# Patient Record
Sex: FEMALE | URBAN - METROPOLITAN AREA
[De-identification: names, ages, dates, MRNs, and addresses within clinical notes are randomized per-mention and may not be internally consistent; named-entity substitution may affect disease eponyms.]

---

## 2017-03-30 ENCOUNTER — EMERGENCY (EMERGENCY)
Facility: HOSPITAL | Age: 57
LOS: 1 days | Discharge: HOME | End: 2017-03-30
Admitting: EMERGENCY MEDICINE
Payer: MEDICAID

## 2017-03-30 VITALS
SYSTOLIC BLOOD PRESSURE: 152 MMHG | TEMPERATURE: 98 F | RESPIRATION RATE: 20 BRPM | DIASTOLIC BLOOD PRESSURE: 100 MMHG | HEART RATE: 100 BPM | OXYGEN SATURATION: 100 %

## 2017-03-30 DIAGNOSIS — R69 ILLNESS, UNSPECIFIED: ICD-10-CM

## 2017-03-30 DIAGNOSIS — F25.9 SCHIZOAFFECTIVE DISORDER, UNSPECIFIED: ICD-10-CM

## 2017-03-30 PROCEDURE — 99284 EMERGENCY DEPT VISIT MOD MDM: CPT

## 2017-03-30 PROCEDURE — 90792 PSYCH DIAG EVAL W/MED SRVCS: CPT | Mod: GC
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
## 2017-03-30 NOTE — ED BEHAVIORAL HEALTH ASSESSMENT NOTE - CASE SUMMARY
This is a 55yo trans man who was brought to the ER with EMS after he called 911.  He had a verbal altercation with a peer and was concerned things who escalate.  He denies SI/HI/beulah/psychosis/depression/anxiety at this time that would require inpatient treatment but he is interested in restarting medication with his outpatient provider.  No indication for inpatient treatment at this time.

## 2017-03-30 NOTE — ED BEHAVIORAL HEALTH ASSESSMENT NOTE - REFERRAL / APPOINTMENT DETAILS
Pt to follow up with his psychiatrist as soon as possible. Pt to follow up with his psychiatrist as soon as possible through his residence
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
## 2017-03-30 NOTE — ED BEHAVIORAL HEALTH ASSESSMENT NOTE - RISK ASSESSMENT
Pt has risk factors of noncompliance with treatment and meds, hx of hospitalizations, diagnosis of schizoaffective disorder.   Pt has protective factors of future orientation, not suicidal or homicidal, no access to firearms, no current substance use.

## 2017-03-30 NOTE — ED ADULT NURSE NOTE - CHIEF COMPLAINT QUOTE
Pt from [facility] arrives with police escort for non compliant to medications x 3 months and erratic behavior.

## 2017-03-30 NOTE — ED ADULT NURSE NOTE - OBJECTIVE STATEMENT
Patient brought from [facility], accompany by  PO handcuffed due to eratical behavior. Patient states that somebody was bothering her for few months. Patient denies SI/HI. Calm and cooperative at the moment, handcuffed removed by PO.

## 2017-03-30 NOTE — ED BEHAVIORAL HEALTH ASSESSMENT NOTE - SUMMARY
Pt is a 56 year old male (transitioning from female gender but previously male gender), domiciled at [facility] at [place], hx of schizoaffective diagnosis, multiple prior hospitalizations most recently 5 years ago, denies suicide attempts, possible hx of aggression-unclear per pt interview, remote hx of substance use, denies legal problems, BIB EMS after pt called police himself.  Pt is calm and cooperative on interview, does not appear internally preoccupied.  He admits to stopping risperdal consta due to side effects.  Pt is not a risk to himself or others and is functioning well.  Residential staff do not have any safety concerns regarding pt.  Will recommend pt to follow up with his psychiatrist as soon as possible and will discharge pt to his residence.  Pt does not meet criteria for inpatient level of care.

## 2017-03-30 NOTE — ED ADULT TRIAGE NOTE - CHIEF COMPLAINT QUOTE
Pt from [facility] arrives with police escort for non compliant to medications x 3 months and erratic behavior.

## 2017-06-15 ENCOUNTER — EMERGENCY (EMERGENCY)
Facility: HOSPITAL | Age: 57
LOS: 1 days | Discharge: HOME | End: 2017-06-15
Attending: EMERGENCY MEDICINE | Admitting: EMERGENCY MEDICINE
Payer: MEDICAID

## 2017-06-15 VITALS
HEART RATE: 101 BPM | DIASTOLIC BLOOD PRESSURE: 84 MMHG | TEMPERATURE: 98 F | SYSTOLIC BLOOD PRESSURE: 134 MMHG | OXYGEN SATURATION: 97 % | RESPIRATION RATE: 18 BRPM

## 2017-06-15 VITALS — HEART RATE: 92 BPM

## 2017-06-15 LAB
ALBUMIN SERPL ELPH-MCNC: 4.4 G/DL — SIGNIFICANT CHANGE UP (ref 3.3–5)
ALP SERPL-CCNC: 82 U/L — SIGNIFICANT CHANGE UP (ref 40–120)
ALT FLD-CCNC: 15 U/L — SIGNIFICANT CHANGE UP (ref 4–41)
AMPHET UR-MCNC: NEGATIVE — SIGNIFICANT CHANGE UP
APAP SERPL-MCNC: < 15 UG/ML — LOW (ref 15–25)
APPEARANCE UR: CLEAR — SIGNIFICANT CHANGE UP
AST SERPL-CCNC: 15 U/L — SIGNIFICANT CHANGE UP (ref 4–40)
BACTERIA # UR AUTO: SIGNIFICANT CHANGE UP
BARBITURATES MEASUREMENT: NEGATIVE — SIGNIFICANT CHANGE UP
BARBITURATES UR SCN-MCNC: NEGATIVE — SIGNIFICANT CHANGE UP
BASOPHILS # BLD AUTO: 0.02 K/UL — SIGNIFICANT CHANGE UP (ref 0–0.2)
BASOPHILS NFR BLD AUTO: 0.2 % — SIGNIFICANT CHANGE UP (ref 0–2)
BENZODIAZ SERPL-MCNC: NEGATIVE — SIGNIFICANT CHANGE UP
BENZODIAZ UR-MCNC: NEGATIVE — SIGNIFICANT CHANGE UP
BILIRUB SERPL-MCNC: 0.5 MG/DL — SIGNIFICANT CHANGE UP (ref 0.2–1.2)
BILIRUB UR-MCNC: NEGATIVE — SIGNIFICANT CHANGE UP
BLOOD UR QL VISUAL: NEGATIVE — SIGNIFICANT CHANGE UP
BUN SERPL-MCNC: 15 MG/DL — SIGNIFICANT CHANGE UP (ref 7–23)
CALCIUM SERPL-MCNC: 9.5 MG/DL — SIGNIFICANT CHANGE UP (ref 8.4–10.5)
CANNABINOIDS UR-MCNC: NEGATIVE — SIGNIFICANT CHANGE UP
CHLORIDE SERPL-SCNC: 105 MMOL/L — SIGNIFICANT CHANGE UP (ref 98–107)
CO2 SERPL-SCNC: 23 MMOL/L — SIGNIFICANT CHANGE UP (ref 22–31)
COCAINE METAB.OTHER UR-MCNC: NEGATIVE — SIGNIFICANT CHANGE UP
COLOR SPEC: YELLOW — SIGNIFICANT CHANGE UP
CREAT SERPL-MCNC: 1.03 MG/DL — SIGNIFICANT CHANGE UP (ref 0.5–1.3)
EOSINOPHIL # BLD AUTO: 0.21 K/UL — SIGNIFICANT CHANGE UP (ref 0–0.5)
EOSINOPHIL NFR BLD AUTO: 2.1 % — SIGNIFICANT CHANGE UP (ref 0–6)
ETHANOL BLD-MCNC: < 10 MG/DL — SIGNIFICANT CHANGE UP
GLUCOSE SERPL-MCNC: 98 MG/DL — SIGNIFICANT CHANGE UP (ref 70–99)
GLUCOSE UR-MCNC: NEGATIVE — SIGNIFICANT CHANGE UP
HCT VFR BLD CALC: 39 % — SIGNIFICANT CHANGE UP (ref 39–50)
HGB BLD-MCNC: 13 G/DL — SIGNIFICANT CHANGE UP (ref 13–17)
IMM GRANULOCYTES NFR BLD AUTO: 0.1 % — SIGNIFICANT CHANGE UP (ref 0–1.5)
KETONES UR-MCNC: NEGATIVE — SIGNIFICANT CHANGE UP
LEUKOCYTE ESTERASE UR-ACNC: NEGATIVE — SIGNIFICANT CHANGE UP
LYMPHOCYTES # BLD AUTO: 2.77 K/UL — SIGNIFICANT CHANGE UP (ref 1–3.3)
LYMPHOCYTES # BLD AUTO: 27.6 % — SIGNIFICANT CHANGE UP (ref 13–44)
MCHC RBC-ENTMCNC: 27.4 PG — SIGNIFICANT CHANGE UP (ref 27–34)
MCHC RBC-ENTMCNC: 33.3 % — SIGNIFICANT CHANGE UP (ref 32–36)
MCV RBC AUTO: 82.3 FL — SIGNIFICANT CHANGE UP (ref 80–100)
METHADONE UR-MCNC: NEGATIVE — SIGNIFICANT CHANGE UP
MONOCYTES # BLD AUTO: 0.46 K/UL — SIGNIFICANT CHANGE UP (ref 0–0.9)
MONOCYTES NFR BLD AUTO: 4.6 % — SIGNIFICANT CHANGE UP (ref 2–14)
MUCOUS THREADS # UR AUTO: SIGNIFICANT CHANGE UP
NEUTROPHILS # BLD AUTO: 6.57 K/UL — SIGNIFICANT CHANGE UP (ref 1.8–7.4)
NEUTROPHILS NFR BLD AUTO: 65.4 % — SIGNIFICANT CHANGE UP (ref 43–77)
NITRITE UR-MCNC: NEGATIVE — SIGNIFICANT CHANGE UP
NON-SQ EPI CELLS # UR AUTO: <1 — SIGNIFICANT CHANGE UP
OPIATES UR-MCNC: NEGATIVE — SIGNIFICANT CHANGE UP
OXYCODONE UR-MCNC: NEGATIVE — SIGNIFICANT CHANGE UP
PCP UR-MCNC: NEGATIVE — SIGNIFICANT CHANGE UP
PH UR: 5.5 — SIGNIFICANT CHANGE UP (ref 4.6–8)
PLATELET # BLD AUTO: 218 K/UL — SIGNIFICANT CHANGE UP (ref 150–400)
PMV BLD: 11.3 FL — SIGNIFICANT CHANGE UP (ref 7–13)
POTASSIUM SERPL-MCNC: 4.2 MMOL/L — SIGNIFICANT CHANGE UP (ref 3.5–5.3)
POTASSIUM SERPL-SCNC: 4.2 MMOL/L — SIGNIFICANT CHANGE UP (ref 3.5–5.3)
PROT SERPL-MCNC: 7.3 G/DL — SIGNIFICANT CHANGE UP (ref 6–8.3)
PROT UR-MCNC: 10 — SIGNIFICANT CHANGE UP
RBC # BLD: 4.74 M/UL — SIGNIFICANT CHANGE UP (ref 4.2–5.8)
RBC # FLD: 14 % — SIGNIFICANT CHANGE UP (ref 10.3–14.5)
RBC CASTS # UR COMP ASSIST: SIGNIFICANT CHANGE UP (ref 0–?)
SALICYLATES SERPL-MCNC: < 5 MG/DL — LOW (ref 15–30)
SODIUM SERPL-SCNC: 143 MMOL/L — SIGNIFICANT CHANGE UP (ref 135–145)
SP GR SPEC: 1.02 — SIGNIFICANT CHANGE UP (ref 1–1.03)
TSH SERPL-MCNC: 0.72 UIU/ML — SIGNIFICANT CHANGE UP (ref 0.27–4.2)
UROBILINOGEN FLD QL: NORMAL E.U. — SIGNIFICANT CHANGE UP (ref 0.1–0.2)
WBC # BLD: 10.04 K/UL — SIGNIFICANT CHANGE UP (ref 3.8–10.5)
WBC # FLD AUTO: 10.04 K/UL — SIGNIFICANT CHANGE UP (ref 3.8–10.5)
WBC UR QL: SIGNIFICANT CHANGE UP (ref 0–?)

## 2017-06-15 PROCEDURE — 90792 PSYCH DIAG EVAL W/MED SRVCS: CPT

## 2017-06-15 PROCEDURE — 99284 EMERGENCY DEPT VISIT MOD MDM: CPT
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
## 2017-06-15 NOTE — ED BEHAVIORAL HEALTH ASSESSMENT NOTE - SUMMARY
Pt is a 56 year old trans man, domiciled at [facility] at [place], hx of schizoaffective diagnosis, multiple prior hospitalizations most recently 5 years ago, denies suicide attempts, possible hx of aggression-unclear per pt interview, remote hx of substance use, denies legal problems, BIB EMS after returning to residence after being away for 6 weeks for "psychiatric clearance."  Slightly disorganized on exam but otherwise without acute psychiatric symptoms.  No safety conerns that would warrant inpatient hospitalization and willing to return to residence and see his outpt psychiatrist.
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
## 2017-06-15 NOTE — ED ADULT NURSE REASSESSMENT NOTE - NS ED NURSE REASSESS COMMENT FT1
16:55-Patient in improved and stable condition, discharged as per MD Garcia order, discharge instructions given, pt verbalized understanding and left ER a&ox3 with metrocard.

## 2017-06-15 NOTE — ED PROVIDER NOTE - ATTENDING CONTRIBUTION TO CARE
I performed a face to face evaluation of this patient and obtained a history and performed a full exam.  I agree with the history, physical exam and plan of the PA.  Pt sent from [facility] for not taking meds, agitation, currently calm without c/o head wnl, lungs and heart wnl, abd soft, psych, no si, hi, ah, vh, calm.  For psych eval

## 2017-06-26 ENCOUNTER — EMERGENCY (EMERGENCY)
Facility: HOSPITAL | Age: 57
LOS: 1 days | Discharge: HOME | End: 2017-06-26
Admitting: EMERGENCY MEDICINE
Payer: MEDICAID

## 2017-06-26 VITALS
DIASTOLIC BLOOD PRESSURE: 89 MMHG | SYSTOLIC BLOOD PRESSURE: 143 MMHG | HEART RATE: 111 BPM | RESPIRATION RATE: 16 BRPM | TEMPERATURE: 98 F | OXYGEN SATURATION: 99 %

## 2017-06-26 PROCEDURE — 90792 PSYCH DIAG EVAL W/MED SRVCS: CPT

## 2017-06-26 PROCEDURE — 99284 EMERGENCY DEPT VISIT MOD MDM: CPT
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
## 2017-06-26 NOTE — ED BEHAVIORAL HEALTH NOTE - BEHAVIORAL HEALTH NOTE
SW informed that pt is stable for d/c and safe to travel via public transportation. SW provided metrocard serial # [id number].

## 2017-06-26 NOTE — ED ADULT TRIAGE NOTE - CHIEF COMPLAINT QUOTE
Pt arrives from [facility] after 911 was initiated by staff for "pt pacing around and carrying a chair leg". A verbal argument ensued after. However, per EMS upon their arrival the pt was calm and cooperative. Denies any si/hi, hallucinations, etoh/substance use.

## 2017-06-26 NOTE — ED PROVIDER NOTE - MEDICAL DECISION MAKING DETAILS
57y/o Male hx of schizophrenia from [place] sent to ed for eval 2/2 agitation. Pt is well appearing, calm and cooperative, w/o visible signs of physical injuries on exam, head NCAT, no C-spine tend, CN II- XII intact, ambulating w/ steady gait, pulm- BL- CTA, CV- S1S2-  Will clear pt medically and dispo as per psych- 57y/o Male hx of schizophrenia from [place] sent to ed for eval 2/2 agitation. Pt is well appearing, calm and cooperative, w/o visible signs of physical injuries on exam, head NCAT, no C-spine tend, CN II- XII intact, ambulating w/ steady gait, pulm- BL- CTA, CV- S1S2, apical pulse 90-  Will clear pt medically and dispo as per psych-

## 2017-06-26 NOTE — ED ADULT NURSE NOTE - CHIEF COMPLAINT QUOTE
Pt arrives from [facility] after 911 was initiated by staff for "pt pacing around and carrying a chair leg". A verbal argument ensued after. However, per EMS upon their arrival the pt was calm and cooperative. Denies any si/hi, hallucinations, etoh/substance use.

## 2017-06-26 NOTE — ED BEHAVIORAL HEALTH ASSESSMENT NOTE - SUMMARY
Pt is a 56 year old trans man, domiciled at [facility] at [place], hx of schizoaffective diagnosis, multiple prior hospitalizations most recently 5 years ago, denies suicide attempts, possible hx of aggression-unclear per pt interview, remote hx of substance use, denies legal problems, BIB EMS after returning to residence after being away for 6 weeks for "psychiatric clearance."  Slightly disorganized on exam but otherwise without acute psychiatric symptoms.  No safety conerns that would warrant inpatient hospitalization and willing to return to residence and see his outpt psychiatrist. Pt is a 56 year old trans man, domiciled at [facility] at [place], hx of schizoaffective diagnosis, multiple prior hospitalizations most recently 5 years ago, denies suicide attempts, possible hx of aggression-unclear per pt interview, remote hx of substance use, denies legal problems, BIB EMS after returning to residence after being away for 6 weeks for "psychiatric clearance."  Slightly disorganized on exam but otherwise without acute psychiatric symptoms.  No safety concerns that would warrant inpatient hospitalization and willing to return to residence and see his outpt psychiatrist.

## 2017-06-26 NOTE — ED PROVIDER NOTE - CHPI ED SYMPTOMS NEG
no homicidal/no weight loss/no disorientation/no suicidal/no agitation/no confusion/no weakness/no hallucinations/no paranoia/no change in level of consciousness

## 2017-06-30 ENCOUNTER — INPATIENT (INPATIENT)
Facility: HOSPITAL | Age: 57
LOS: 10 days | Discharge: HOME | End: 2017-07-11
Attending: PSYCHIATRY & NEUROLOGY | Admitting: PSYCHIATRY & NEUROLOGY
Payer: MEDICAID

## 2017-06-30 VITALS
HEART RATE: 93 BPM | OXYGEN SATURATION: 99 % | DIASTOLIC BLOOD PRESSURE: 89 MMHG | TEMPERATURE: 98 F | RESPIRATION RATE: 18 BRPM | SYSTOLIC BLOOD PRESSURE: 134 MMHG

## 2017-06-30 DIAGNOSIS — F20.0 PARANOID SCHIZOPHRENIA: ICD-10-CM

## 2017-06-30 DIAGNOSIS — F20.9 SCHIZOPHRENIA, UNSPECIFIED: ICD-10-CM

## 2017-06-30 DIAGNOSIS — F10.10 ALCOHOL ABUSE, UNCOMPLICATED: ICD-10-CM

## 2017-06-30 LAB
ALBUMIN SERPL ELPH-MCNC: 4.3 G/DL — SIGNIFICANT CHANGE UP (ref 3.3–5)
ALP SERPL-CCNC: 81 U/L — SIGNIFICANT CHANGE UP (ref 40–120)
ALT FLD-CCNC: 16 U/L — SIGNIFICANT CHANGE UP (ref 4–41)
AMPHET UR-MCNC: NEGATIVE — SIGNIFICANT CHANGE UP
APAP SERPL-MCNC: < 15 UG/ML — LOW (ref 15–25)
APPEARANCE UR: CLEAR — SIGNIFICANT CHANGE UP
AST SERPL-CCNC: 16 U/L — SIGNIFICANT CHANGE UP (ref 4–40)
BACTERIA # UR AUTO: SIGNIFICANT CHANGE UP
BARBITURATES MEASUREMENT: NEGATIVE — SIGNIFICANT CHANGE UP
BARBITURATES UR SCN-MCNC: NEGATIVE — SIGNIFICANT CHANGE UP
BASOPHILS # BLD AUTO: 0.05 K/UL — SIGNIFICANT CHANGE UP (ref 0–0.2)
BASOPHILS NFR BLD AUTO: 0.6 % — SIGNIFICANT CHANGE UP (ref 0–2)
BENZODIAZ SERPL-MCNC: NEGATIVE — SIGNIFICANT CHANGE UP
BENZODIAZ UR-MCNC: NEGATIVE — SIGNIFICANT CHANGE UP
BILIRUB SERPL-MCNC: 0.2 MG/DL — SIGNIFICANT CHANGE UP (ref 0.2–1.2)
BILIRUB UR-MCNC: NEGATIVE — SIGNIFICANT CHANGE UP
BLOOD UR QL VISUAL: NEGATIVE — SIGNIFICANT CHANGE UP
BUN SERPL-MCNC: 11 MG/DL — SIGNIFICANT CHANGE UP (ref 7–23)
CALCIUM SERPL-MCNC: 9.5 MG/DL — SIGNIFICANT CHANGE UP (ref 8.4–10.5)
CANNABINOIDS UR-MCNC: NEGATIVE — SIGNIFICANT CHANGE UP
CHLORIDE SERPL-SCNC: 102 MMOL/L — SIGNIFICANT CHANGE UP (ref 98–107)
CO2 SERPL-SCNC: 24 MMOL/L — SIGNIFICANT CHANGE UP (ref 22–31)
COCAINE METAB.OTHER UR-MCNC: NEGATIVE — SIGNIFICANT CHANGE UP
COLOR SPEC: YELLOW — SIGNIFICANT CHANGE UP
CREAT SERPL-MCNC: 1.01 MG/DL — SIGNIFICANT CHANGE UP (ref 0.5–1.3)
EOSINOPHIL # BLD AUTO: 0.17 K/UL — SIGNIFICANT CHANGE UP (ref 0–0.5)
EOSINOPHIL NFR BLD AUTO: 2.2 % — SIGNIFICANT CHANGE UP (ref 0–6)
ETHANOL BLD-MCNC: < 10 MG/DL — SIGNIFICANT CHANGE UP
GLUCOSE SERPL-MCNC: 100 MG/DL — HIGH (ref 70–99)
GLUCOSE UR-MCNC: NEGATIVE — SIGNIFICANT CHANGE UP
HCT VFR BLD CALC: 39.1 % — SIGNIFICANT CHANGE UP (ref 39–50)
HGB BLD-MCNC: 13.1 G/DL — SIGNIFICANT CHANGE UP (ref 13–17)
IMM GRANULOCYTES # BLD AUTO: 0.03 # — SIGNIFICANT CHANGE UP
IMM GRANULOCYTES NFR BLD AUTO: 0.4 % — SIGNIFICANT CHANGE UP (ref 0–1.5)
KETONES UR-MCNC: NEGATIVE — SIGNIFICANT CHANGE UP
LEUKOCYTE ESTERASE UR-ACNC: NEGATIVE — SIGNIFICANT CHANGE UP
LYMPHOCYTES # BLD AUTO: 2.19 K/UL — SIGNIFICANT CHANGE UP (ref 1–3.3)
LYMPHOCYTES # BLD AUTO: 28.4 % — SIGNIFICANT CHANGE UP (ref 13–44)
MCHC RBC-ENTMCNC: 27.3 PG — SIGNIFICANT CHANGE UP (ref 27–34)
MCHC RBC-ENTMCNC: 33.5 % — SIGNIFICANT CHANGE UP (ref 32–36)
MCV RBC AUTO: 81.6 FL — SIGNIFICANT CHANGE UP (ref 80–100)
METHADONE UR-MCNC: NEGATIVE — SIGNIFICANT CHANGE UP
MONOCYTES # BLD AUTO: 0.36 K/UL — SIGNIFICANT CHANGE UP (ref 0–0.9)
MONOCYTES NFR BLD AUTO: 4.7 % — SIGNIFICANT CHANGE UP (ref 2–14)
MUCOUS THREADS # UR AUTO: SIGNIFICANT CHANGE UP
NEUTROPHILS # BLD AUTO: 4.92 K/UL — SIGNIFICANT CHANGE UP (ref 1.8–7.4)
NEUTROPHILS NFR BLD AUTO: 63.7 % — SIGNIFICANT CHANGE UP (ref 43–77)
NITRITE UR-MCNC: NEGATIVE — SIGNIFICANT CHANGE UP
NRBC # FLD: 0 — SIGNIFICANT CHANGE UP
OPIATES UR-MCNC: NEGATIVE — SIGNIFICANT CHANGE UP
OXYCODONE UR-MCNC: NEGATIVE — SIGNIFICANT CHANGE UP
PCP UR-MCNC: NEGATIVE — SIGNIFICANT CHANGE UP
PH UR: 6 — SIGNIFICANT CHANGE UP (ref 4.6–8)
PLATELET # BLD AUTO: 242 K/UL — SIGNIFICANT CHANGE UP (ref 150–400)
PMV BLD: 11 FL — SIGNIFICANT CHANGE UP (ref 7–13)
POTASSIUM SERPL-MCNC: 4.2 MMOL/L — SIGNIFICANT CHANGE UP (ref 3.5–5.3)
POTASSIUM SERPL-SCNC: 4.2 MMOL/L — SIGNIFICANT CHANGE UP (ref 3.5–5.3)
PROT SERPL-MCNC: 7.2 G/DL — SIGNIFICANT CHANGE UP (ref 6–8.3)
PROT UR-MCNC: 30 — SIGNIFICANT CHANGE UP
RBC # BLD: 4.79 M/UL — SIGNIFICANT CHANGE UP (ref 4.2–5.8)
RBC # FLD: 14 % — SIGNIFICANT CHANGE UP (ref 10.3–14.5)
RBC CASTS # UR COMP ASSIST: SIGNIFICANT CHANGE UP (ref 0–?)
SALICYLATES SERPL-MCNC: < 5 MG/DL — LOW (ref 15–30)
SODIUM SERPL-SCNC: 141 MMOL/L — SIGNIFICANT CHANGE UP (ref 135–145)
SP GR SPEC: 1.02 — SIGNIFICANT CHANGE UP (ref 1–1.03)
SQUAMOUS # UR AUTO: SIGNIFICANT CHANGE UP
TSH SERPL-MCNC: 0.54 UIU/ML — SIGNIFICANT CHANGE UP (ref 0.27–4.2)
UROBILINOGEN FLD QL: 1 E.U. — SIGNIFICANT CHANGE UP (ref 0.1–0.2)
WBC # BLD: 7.72 K/UL — SIGNIFICANT CHANGE UP (ref 3.8–10.5)
WBC # FLD AUTO: 7.72 K/UL — SIGNIFICANT CHANGE UP (ref 3.8–10.5)
WBC UR QL: SIGNIFICANT CHANGE UP (ref 0–?)

## 2017-06-30 PROCEDURE — 99285 EMERGENCY DEPT VISIT HI MDM: CPT | Mod: GC

## 2017-06-30 RX ORDER — ACETAMINOPHEN 500 MG
650 TABLET ORAL EVERY 6 HOURS
Qty: 0 | Refills: 0 | Status: DISCONTINUED | OUTPATIENT
Start: 2017-06-30 | End: 2017-07-11

## 2017-06-30 RX ORDER — ARIPIPRAZOLE 15 MG/1
5 TABLET ORAL DAILY
Qty: 0 | Refills: 0 | Status: DISCONTINUED | OUTPATIENT
Start: 2017-06-30 | End: 2017-06-30

## 2017-06-30 RX ORDER — HALOPERIDOL DECANOATE 100 MG/ML
5 INJECTION INTRAMUSCULAR EVERY 6 HOURS
Qty: 0 | Refills: 0 | Status: DISCONTINUED | OUTPATIENT
Start: 2017-06-30 | End: 2017-07-11

## 2017-06-30 RX ORDER — HALOPERIDOL DECANOATE 100 MG/ML
5 INJECTION INTRAMUSCULAR ONCE
Qty: 0 | Refills: 0 | Status: DISCONTINUED | OUTPATIENT
Start: 2017-06-30 | End: 2017-07-11

## 2017-06-30 RX ORDER — ARIPIPRAZOLE 15 MG/1
10 TABLET ORAL DAILY
Qty: 0 | Refills: 0 | Status: DISCONTINUED | OUTPATIENT
Start: 2017-06-30 | End: 2017-07-03

## 2017-06-30 RX ADMIN — Medication 650 MILLIGRAM(S): at 16:33

## 2017-06-30 RX ADMIN — Medication 2 MILLIGRAM(S): at 20:30

## 2017-06-30 RX ADMIN — Medication 650 MILLIGRAM(S): at 15:33
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
## 2017-06-30 NOTE — ED ADULT NURSE REASSESSMENT NOTE - NS ED NURSE REASSESS COMMENT FT1
14:20-Patient transported to [facility] via security, safety maintained.

## 2017-06-30 NOTE — ED PROVIDER NOTE - OBJECTIVE STATEMENT
Denies pain, SOB, fever, chills, chest/abdominal discomfort. Denies HI/AH/VH. Denies recent use of alcohol or illicit drugs. 55 y/o Schizoaffective Disorder BIBA  w c/o acting out behaviour ,yelling and breaking things in his apartment secondary to medication non compliance. Denies punching or kicking any objects.  Denies pain, SOB, fever, chills, chest/abdominal discomfort. Denies SI/HI/AH/VH. Denies recent use of alcohol or illicit drugs.

## 2017-06-30 NOTE — ED BEHAVIORAL HEALTH ASSESSMENT NOTE - DESCRIPTION
Patient calm and cooperative, but paranoid. None. Patient on disability. Lives at [facility].

## 2017-06-30 NOTE — ED BEHAVIORAL HEALTH NOTE - BEHAVIORAL HEALTH NOTE
SW contacted [employer] [phone] to obtain pre-cert for pt's recent psychiatric admission to [facility]. As per Sam at [employer], pt is receiving SSI and his benefits are carved out of [facility] Medicaid. Pt does not require pre-cert for psych inpatient admission. No further SW intervention required at this time.

## 2017-06-30 NOTE — ED BEHAVIORAL HEALTH ASSESSMENT NOTE - AXIS IV
Problems with interaction with legal system/Economic problems/Occupational problems/Problems with primary support

## 2017-06-30 NOTE — ED BEHAVIORAL HEALTH ASSESSMENT NOTE - OTHER PAST PSYCHIATRIC HISTORY (INCLUDE DETAILS REGARDING ONSET, COURSE OF ILLNESS, INPATIENT/OUTPATIENT TREATMENT)
Patient has at least 4 prior inpatient psychiatric hospitalizations, including a hospitalization at [facility]. He has been on Risperdal, Haldol, Cogentin, Zyprexa, Clozapine (per him). According to his outpatient clinic, he has been on Risperdal Consta 25 mg IM, however he had gynecomastia. Was on Abilify. Was recently restarted on Abilify, however patient did not take it. He has not taken meds since 12/16.

## 2017-06-30 NOTE — ED BEHAVIORAL HEALTH ASSESSMENT NOTE - DESCRIPTION (FIRST USE, LAST USE, QUANTITY, FREQUENCY, DURATION)
Patient smokes 1-2 cigarettes daily. Patient drinks 1-2 drinks daily. Patient reports remote abuse of cannabis.
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
## 2017-06-30 NOTE — ED BEHAVIORAL HEALTH ASSESSMENT NOTE - DEPENDENTS
Gregoria Lemos is a 47 year old female presenting with a cold and started Saturday with a ST.  Then starting Sunday fevers and chills nasal and chest congestion.  Ears fells plugged too.      Denies known Latex allergy or symptoms of Latex sensitivity.  Currently is not taking any medications.  History   Smoking Status   • Never Smoker   Smokeless Tobacco   • Never Used       PCP verified  Pharmacy verified         None known

## 2017-06-30 NOTE — ED BEHAVIORAL HEALTH ASSESSMENT NOTE - SUMMARY
56 year old male with a history of schizoaffective disorder, presenting with agitation in the context of medication non-compliance. The patient is paranoid with thought disorder on interview. The patient has been agitated and violent at his residence and unhygenic (had fecal matter on the floor of the bathroom). Patient an acute risk to himself and others at this point.

## 2017-06-30 NOTE — ED PROVIDER NOTE - CARE PLAN
Principal Discharge DX:	Paranoid schizophrenia  Secondary Diagnosis:	Alcohol use disorder, mild, abuse

## 2017-06-30 NOTE — ED PROVIDER NOTE - MEDICAL DECISION MAKING DETAILS
55 y/o Schizoaffective Disorder  Labs, Urine Tox/UA, EKG .   Medical evaluation performed. There is no clinical evidence of intoxication or any acute medical problem requiring immediate intervention. Patient is awaiting psychiatric consultation. Final disposition will be determined by psychiatrist.

## 2017-06-30 NOTE — ED BEHAVIORAL HEALTH ASSESSMENT NOTE - SUICIDE RISK FACTORS
Unable to engage in safety planning/Access to means (pills, firearms, etc.)/Highly impulsive behavior/Substance abuse/dependence/Agitation/severe anxiety/History of abuse/trauma
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
## 2017-06-30 NOTE — ED BEHAVIORAL HEALTH ASSESSMENT NOTE - SUBSTANCE ISSUES AND PLAN (INCLUDE STANDING AND PRN MEDICATION)
Patient drinks 1-2 drinks daily and has never experienced alcohol withdrawal. Patient does not require CIWA or CINA.
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
## 2017-06-30 NOTE — ED ADULT TRIAGE NOTE - CHIEF COMPLAINT QUOTE
Patient brought to [place] by EMS for psych evaluation. Pt was throwing objects in facility. Pt non compliant with medication,
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
## 2017-07-01 PROCEDURE — 99223 1ST HOSP IP/OBS HIGH 75: CPT

## 2017-07-01 RX ADMIN — ARIPIPRAZOLE 10 MILLIGRAM(S): 15 TABLET ORAL at 08:42

## 2017-07-02 PROCEDURE — 99232 SBSQ HOSP IP/OBS MODERATE 35: CPT

## 2017-07-02 RX ADMIN — ARIPIPRAZOLE 10 MILLIGRAM(S): 15 TABLET ORAL at 09:15

## 2017-07-03 PROCEDURE — 99232 SBSQ HOSP IP/OBS MODERATE 35: CPT

## 2017-07-03 RX ORDER — ARIPIPRAZOLE 15 MG/1
20 TABLET ORAL DAILY
Qty: 0 | Refills: 0 | Status: DISCONTINUED | OUTPATIENT
Start: 2017-07-03 | End: 2017-07-05

## 2017-07-03 RX ADMIN — Medication 650 MILLIGRAM(S): at 18:44

## 2017-07-03 RX ADMIN — ARIPIPRAZOLE 10 MILLIGRAM(S): 15 TABLET ORAL at 08:09

## 2017-07-03 RX ADMIN — Medication 650 MILLIGRAM(S): at 17:44

## 2017-07-03 RX ADMIN — Medication 650 MILLIGRAM(S): at 11:37

## 2017-07-03 RX ADMIN — Medication 650 MILLIGRAM(S): at 12:37

## 2017-07-04 PROCEDURE — 99232 SBSQ HOSP IP/OBS MODERATE 35: CPT

## 2017-07-04 RX ADMIN — ARIPIPRAZOLE 20 MILLIGRAM(S): 15 TABLET ORAL at 08:02

## 2017-07-04 RX ADMIN — Medication 650 MILLIGRAM(S): at 08:15

## 2017-07-04 RX ADMIN — Medication 650 MILLIGRAM(S): at 07:15

## 2017-07-05 PROCEDURE — 99232 SBSQ HOSP IP/OBS MODERATE 35: CPT

## 2017-07-05 RX ORDER — ARIPIPRAZOLE 15 MG/1
30 TABLET ORAL DAILY
Qty: 0 | Refills: 0 | Status: DISCONTINUED | OUTPATIENT
Start: 2017-07-05 | End: 2017-07-11

## 2017-07-05 RX ADMIN — ARIPIPRAZOLE 20 MILLIGRAM(S): 15 TABLET ORAL at 08:09

## 2017-07-05 RX ADMIN — Medication 650 MILLIGRAM(S): at 17:20

## 2017-07-05 RX ADMIN — Medication 2 MILLIGRAM(S): at 20:38

## 2017-07-05 RX ADMIN — Medication 650 MILLIGRAM(S): at 08:59

## 2017-07-05 RX ADMIN — Medication 650 MILLIGRAM(S): at 16:29

## 2017-07-05 RX ADMIN — Medication 650 MILLIGRAM(S): at 07:50

## 2017-07-06 PROCEDURE — 99232 SBSQ HOSP IP/OBS MODERATE 35: CPT

## 2017-07-06 RX ADMIN — ARIPIPRAZOLE 30 MILLIGRAM(S): 15 TABLET ORAL at 08:48

## 2017-07-06 RX ADMIN — Medication 650 MILLIGRAM(S): at 14:59

## 2017-07-06 RX ADMIN — Medication 650 MILLIGRAM(S): at 15:01

## 2017-07-07 PROCEDURE — 99232 SBSQ HOSP IP/OBS MODERATE 35: CPT

## 2017-07-07 RX ORDER — NICOTINE POLACRILEX 2 MG
4 GUM BUCCAL
Qty: 0 | Refills: 0 | Status: DISCONTINUED | OUTPATIENT
Start: 2017-07-07 | End: 2017-07-11

## 2017-07-07 RX ADMIN — Medication 650 MILLIGRAM(S): at 09:42

## 2017-07-07 RX ADMIN — Medication 4 MILLIGRAM(S): at 14:48

## 2017-07-07 RX ADMIN — ARIPIPRAZOLE 30 MILLIGRAM(S): 15 TABLET ORAL at 08:05

## 2017-07-07 RX ADMIN — Medication 650 MILLIGRAM(S): at 09:44

## 2017-07-08 RX ADMIN — ARIPIPRAZOLE 30 MILLIGRAM(S): 15 TABLET ORAL at 08:11

## 2017-07-08 RX ADMIN — Medication 4 MILLIGRAM(S): at 11:20

## 2017-07-08 RX ADMIN — Medication 650 MILLIGRAM(S): at 06:44

## 2017-07-08 RX ADMIN — Medication 2 MILLIGRAM(S): at 21:28

## 2017-07-09 RX ORDER — SIMETHICONE 80 MG/1
80 TABLET, CHEWABLE ORAL ONCE
Qty: 0 | Refills: 0 | Status: COMPLETED | OUTPATIENT
Start: 2017-07-09 | End: 2017-07-09

## 2017-07-09 RX ADMIN — SIMETHICONE 80 MILLIGRAM(S): 80 TABLET, CHEWABLE ORAL at 16:41

## 2017-07-09 RX ADMIN — Medication 4 MILLIGRAM(S): at 13:20

## 2017-07-09 RX ADMIN — Medication 650 MILLIGRAM(S): at 16:29

## 2017-07-09 RX ADMIN — Medication 650 MILLIGRAM(S): at 17:00

## 2017-07-09 RX ADMIN — ARIPIPRAZOLE 30 MILLIGRAM(S): 15 TABLET ORAL at 08:18

## 2017-07-10 PROCEDURE — 99232 SBSQ HOSP IP/OBS MODERATE 35: CPT

## 2017-07-10 PROCEDURE — 73030 X-RAY EXAM OF SHOULDER: CPT | Mod: 26,RT

## 2017-07-10 RX ADMIN — ARIPIPRAZOLE 30 MILLIGRAM(S): 15 TABLET ORAL at 08:05

## 2017-07-11 VITALS — TEMPERATURE: 98 F

## 2017-07-11 PROCEDURE — 99238 HOSP IP/OBS DSCHRG MGMT 30/<: CPT

## 2017-07-11 RX ORDER — ARIPIPRAZOLE 15 MG/1
400 TABLET ORAL
Qty: 1 | Refills: 0 | OUTPATIENT
Start: 2017-07-11 | End: 2017-08-08

## 2017-07-11 RX ORDER — ARIPIPRAZOLE 15 MG/1
1 TABLET ORAL
Qty: 0 | Refills: 0 | COMMUNITY
Start: 2017-07-11

## 2017-07-11 RX ORDER — NICOTINE POLACRILEX 2 MG
0 GUM BUCCAL
Qty: 0 | Refills: 0 | COMMUNITY
Start: 2017-07-11

## 2017-07-11 RX ORDER — ARIPIPRAZOLE 15 MG/1
400 TABLET ORAL ONCE
Qty: 0 | Refills: 0 | Status: COMPLETED | OUTPATIENT
Start: 2017-07-11 | End: 2017-07-11

## 2017-07-11 RX ADMIN — ARIPIPRAZOLE 400 MILLIGRAM(S): 15 TABLET ORAL at 12:52

## 2017-07-11 RX ADMIN — ARIPIPRAZOLE 30 MILLIGRAM(S): 15 TABLET ORAL at 08:05

## 2017-07-11 RX ADMIN — Medication 650 MILLIGRAM(S): at 05:08

## 2017-07-11 RX ADMIN — Medication 4 MILLIGRAM(S): at 06:43

## 2017-09-23 ENCOUNTER — EMERGENCY (EMERGENCY)
Facility: HOSPITAL | Age: 57
LOS: 1 days | Discharge: LEFT BEFORE DISCHARGE | End: 2017-09-23
Attending: EMERGENCY MEDICINE | Admitting: EMERGENCY MEDICINE
Payer: MEDICAID

## 2017-09-23 VITALS
RESPIRATION RATE: 16 BRPM | HEART RATE: 86 BPM | TEMPERATURE: 98 F | DIASTOLIC BLOOD PRESSURE: 80 MMHG | SYSTOLIC BLOOD PRESSURE: 126 MMHG | OXYGEN SATURATION: 100 %

## 2017-09-23 VITALS — TEMPERATURE: 98 F | RESPIRATION RATE: 16 BRPM

## 2017-09-23 PROCEDURE — 90792 PSYCH DIAG EVAL W/MED SRVCS: CPT
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
## 2017-09-23 NOTE — ED ADULT NURSE NOTE - CHIEF COMPLAINT QUOTE
pt reports to triage. not fully oriented. stating chest pain,diff breathing. rambling on incoherently. stating" I don't want to discuss my pain" pt appears non labored respirations fit/behavioral health called and responded/

## 2017-09-23 NOTE — ED BEHAVIORAL HEALTH ASSESSMENT NOTE - SUMMARY
56 year old male with a history of schizoaffective disorder, presenting wanting a physical and then deciding that she does not want one. No acute safety risk.  No clear reason for evaluation.  Has capacity and states that she wants to return to [facility]. There is no indication for an involuntary admission.  To discharge home.

## 2017-09-23 NOTE — ED BEHAVIORAL HEALTH ASSESSMENT NOTE - DESCRIPTION
Patient calm and cooperative, slightly paranoid.  ICU Vital Signs Last 24 Hrs  T(C): 36.8 (23 Sep 2017 08:22), Max: 36.8 (23 Sep 2017 08:22)  T(F): 98.3 (23 Sep 2017 08:22), Max: 98.3 (23 Sep 2017 08:22)  HR: --  BP: --  BP(mean): --  ABP: --  ABP(mean): --  RR: 16 (23 Sep 2017 08:22) (16 - 16)  SpO2: -- None. Patient on disability. Lives at [facility].
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
## 2017-09-23 NOTE — ED BEHAVIORAL HEALTH ASSESSMENT NOTE - SUICIDE RISK FACTORS
Agitation/severe anxiety/Access to means (pills, firearms, etc.)/History of abuse/trauma/Substance abuse/dependence/Unable to engage in safety planning

## 2017-09-23 NOTE — ED BEHAVIORAL HEALTH ASSESSMENT NOTE - AXIS IV
Problems with primary support/Economic problems/Problems with interaction with legal system/Occupational problems

## 2017-09-23 NOTE — ED ADULT TRIAGE NOTE - CHIEF COMPLAINT QUOTE
pt reports to triage. not fully oriented. stating chest pain,diff breathing. rambling on incoherently. stating" I don't want to discuss my pain" pt appears non labored pt reports to triage. not fully oriented. stating chest pain,diff breathing. rambling on incoherently. stating" I don't want to discuss my pain" pt appears non labored respirations fit/behavioral health called and responded/

## 2017-09-23 NOTE — ED BEHAVIORAL HEALTH ASSESSMENT NOTE - OTHER PAST PSYCHIATRIC HISTORY (INCLUDE DETAILS REGARDING ONSET, COURSE OF ILLNESS, INPATIENT/OUTPATIENT TREATMENT)
Patient has at least 4 prior inpatient psychiatric hospitalizations, including a hospitalization at [facility]. He has been on Risperdal, Haldol, Cogentin, Zyprexa, Clozapine (per him). According to his outpatient clinic, he has been on Risperdal Consta 25 mg IM, however he had gynecomastia. Was on Abilify. Was recently restarted on Abilify, however patient did not take it. He has not taken meds since 12/16.

## 2017-09-23 NOTE — ED PROVIDER NOTE - OBJECTIVE STATEMENT
pt presented with symptoms that she does not want to discuss. BH was eval because she was from [facility], however no psych issues.   I spoke with the patient privately. She does not want to be seen and is refusing a medical screening examination. She is A+O x 3, calm, not under influence of drugs and can state her wishes coherently. She is walking well and can state the risks of leaving w/o medical eval. She has clear capacity to refuse a medical screening exam.
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
## 2018-07-01 ENCOUNTER — OUTPATIENT (OUTPATIENT)
Dept: OUTPATIENT SERVICES | Facility: HOSPITAL | Age: 58
LOS: 1 days | End: 2018-07-01
Payer: MEDICAID

## 2018-07-31 DIAGNOSIS — Z71.89 OTHER SPECIFIED COUNSELING: ICD-10-CM

## 2018-07-31 PROBLEM — F64.0 TRANSSEXUALISM: Chronic | Status: ACTIVE | Noted: 2017-03-30

## 2018-07-31 PROBLEM — F20.9 SCHIZOPHRENIA, UNSPECIFIED: Chronic | Status: ACTIVE | Noted: 2017-06-15

## 2018-08-01 PROCEDURE — G9005: CPT

## 2018-10-01 ENCOUNTER — OUTPATIENT (OUTPATIENT)
Dept: OUTPATIENT SERVICES | Facility: HOSPITAL | Age: 58
LOS: 1 days | End: 2018-10-01
Payer: MEDICAID

## 2018-10-15 DIAGNOSIS — Z71.89 OTHER SPECIFIED COUNSELING: ICD-10-CM

## 2019-07-18 NOTE — ED BEHAVIORAL HEALTH ASSESSMENT NOTE - MEDICATIONS (PRESCRIPTIONS, DIRECTIONS)
18-Jul-2019 23:35 Pt to discuss medication options with his psychiatrist as he is currently not on meds.

## 2019-10-16 ENCOUNTER — EMERGENCY (EMERGENCY)
Facility: HOSPITAL | Age: 59
LOS: 1 days | Discharge: HOME | End: 2019-10-16
Attending: EMERGENCY MEDICINE | Admitting: EMERGENCY MEDICINE
Payer: MEDICAID

## 2019-10-16 VITALS
RESPIRATION RATE: 17 BRPM | DIASTOLIC BLOOD PRESSURE: 90 MMHG | HEART RATE: 78 BPM | SYSTOLIC BLOOD PRESSURE: 138 MMHG | OXYGEN SATURATION: 100 % | TEMPERATURE: 98 F

## 2019-10-16 VITALS
TEMPERATURE: 99 F | SYSTOLIC BLOOD PRESSURE: 133 MMHG | HEART RATE: 90 BPM | DIASTOLIC BLOOD PRESSURE: 96 MMHG | RESPIRATION RATE: 18 BRPM | OXYGEN SATURATION: 100 %

## 2019-10-16 DIAGNOSIS — F25.0 SCHIZOAFFECTIVE DISORDER, BIPOLAR TYPE: ICD-10-CM

## 2019-10-16 LAB
ALBUMIN SERPL ELPH-MCNC: 4.5 G/DL — SIGNIFICANT CHANGE UP (ref 3.3–5)
ALP SERPL-CCNC: 72 U/L — SIGNIFICANT CHANGE UP (ref 40–120)
ALT FLD-CCNC: 14 U/L — SIGNIFICANT CHANGE UP (ref 4–41)
AMPHET UR-MCNC: NEGATIVE — SIGNIFICANT CHANGE UP
ANION GAP SERPL CALC-SCNC: 14 MMO/L — SIGNIFICANT CHANGE UP (ref 7–14)
APAP SERPL-MCNC: < 15 UG/ML — LOW (ref 15–25)
APPEARANCE UR: CLEAR — SIGNIFICANT CHANGE UP
AST SERPL-CCNC: 18 U/L — SIGNIFICANT CHANGE UP (ref 4–40)
BARBITURATES UR SCN-MCNC: NEGATIVE — SIGNIFICANT CHANGE UP
BASOPHILS # BLD AUTO: 0.06 K/UL — SIGNIFICANT CHANGE UP (ref 0–0.2)
BASOPHILS NFR BLD AUTO: 0.6 % — SIGNIFICANT CHANGE UP (ref 0–2)
BENZODIAZ UR-MCNC: NEGATIVE — SIGNIFICANT CHANGE UP
BILIRUB SERPL-MCNC: 0.4 MG/DL — SIGNIFICANT CHANGE UP (ref 0.2–1.2)
BILIRUB UR-MCNC: NEGATIVE — SIGNIFICANT CHANGE UP
BLOOD UR QL VISUAL: NEGATIVE — SIGNIFICANT CHANGE UP
BUN SERPL-MCNC: 8 MG/DL — SIGNIFICANT CHANGE UP (ref 7–23)
CALCIUM SERPL-MCNC: 9.5 MG/DL — SIGNIFICANT CHANGE UP (ref 8.4–10.5)
CANNABINOIDS UR-MCNC: NEGATIVE — SIGNIFICANT CHANGE UP
CHLORIDE SERPL-SCNC: 101 MMOL/L — SIGNIFICANT CHANGE UP (ref 98–107)
CO2 SERPL-SCNC: 23 MMOL/L — SIGNIFICANT CHANGE UP (ref 22–31)
COCAINE METAB.OTHER UR-MCNC: NEGATIVE — SIGNIFICANT CHANGE UP
COLOR SPEC: SIGNIFICANT CHANGE UP
CREAT SERPL-MCNC: 1.15 MG/DL — SIGNIFICANT CHANGE UP (ref 0.5–1.3)
EOSINOPHIL # BLD AUTO: 0.21 K/UL — SIGNIFICANT CHANGE UP (ref 0–0.5)
EOSINOPHIL NFR BLD AUTO: 2.2 % — SIGNIFICANT CHANGE UP (ref 0–6)
ETHANOL BLD-MCNC: < 10 MG/DL — SIGNIFICANT CHANGE UP
GLUCOSE SERPL-MCNC: 78 MG/DL — SIGNIFICANT CHANGE UP (ref 70–99)
GLUCOSE UR-MCNC: NEGATIVE — SIGNIFICANT CHANGE UP
HCT VFR BLD CALC: 43.9 % — SIGNIFICANT CHANGE UP (ref 39–50)
HGB BLD-MCNC: 14 G/DL — SIGNIFICANT CHANGE UP (ref 13–17)
IMM GRANULOCYTES NFR BLD AUTO: 0.3 % — SIGNIFICANT CHANGE UP (ref 0–1.5)
KETONES UR-MCNC: NEGATIVE — SIGNIFICANT CHANGE UP
LEUKOCYTE ESTERASE UR-ACNC: NEGATIVE — SIGNIFICANT CHANGE UP
LYMPHOCYTES # BLD AUTO: 3.23 K/UL — SIGNIFICANT CHANGE UP (ref 1–3.3)
LYMPHOCYTES # BLD AUTO: 34.5 % — SIGNIFICANT CHANGE UP (ref 13–44)
MCHC RBC-ENTMCNC: 27.2 PG — SIGNIFICANT CHANGE UP (ref 27–34)
MCHC RBC-ENTMCNC: 31.9 % — LOW (ref 32–36)
MCV RBC AUTO: 85.4 FL — SIGNIFICANT CHANGE UP (ref 80–100)
METHADONE UR-MCNC: NEGATIVE — SIGNIFICANT CHANGE UP
MONOCYTES # BLD AUTO: 0.44 K/UL — SIGNIFICANT CHANGE UP (ref 0–0.9)
MONOCYTES NFR BLD AUTO: 4.7 % — SIGNIFICANT CHANGE UP (ref 2–14)
NEUTROPHILS # BLD AUTO: 5.4 K/UL — SIGNIFICANT CHANGE UP (ref 1.8–7.4)
NEUTROPHILS NFR BLD AUTO: 57.7 % — SIGNIFICANT CHANGE UP (ref 43–77)
NITRITE UR-MCNC: NEGATIVE — SIGNIFICANT CHANGE UP
NRBC # FLD: 0 K/UL — SIGNIFICANT CHANGE UP (ref 0–0)
OPIATES UR-MCNC: NEGATIVE — SIGNIFICANT CHANGE UP
OXYCODONE UR-MCNC: NEGATIVE — SIGNIFICANT CHANGE UP
PCP UR-MCNC: NEGATIVE — SIGNIFICANT CHANGE UP
PH UR: 6 — SIGNIFICANT CHANGE UP (ref 5–8)
PLATELET # BLD AUTO: 227 K/UL — SIGNIFICANT CHANGE UP (ref 150–400)
PMV BLD: 10.7 FL — SIGNIFICANT CHANGE UP (ref 7–13)
POTASSIUM SERPL-MCNC: 4.1 MMOL/L — SIGNIFICANT CHANGE UP (ref 3.5–5.3)
POTASSIUM SERPL-SCNC: 4.1 MMOL/L — SIGNIFICANT CHANGE UP (ref 3.5–5.3)
PROT SERPL-MCNC: 7.1 G/DL — SIGNIFICANT CHANGE UP (ref 6–8.3)
PROT UR-MCNC: NEGATIVE — SIGNIFICANT CHANGE UP
RBC # BLD: 5.14 M/UL — SIGNIFICANT CHANGE UP (ref 4.2–5.8)
RBC # FLD: 12.8 % — SIGNIFICANT CHANGE UP (ref 10.3–14.5)
SALICYLATES SERPL-MCNC: < 5 MG/DL — LOW (ref 15–30)
SODIUM SERPL-SCNC: 138 MMOL/L — SIGNIFICANT CHANGE UP (ref 135–145)
SP GR SPEC: 1.01 — SIGNIFICANT CHANGE UP (ref 1–1.04)
TSH SERPL-MCNC: 0.91 UIU/ML — SIGNIFICANT CHANGE UP (ref 0.27–4.2)
UROBILINOGEN FLD QL: NORMAL — SIGNIFICANT CHANGE UP
WBC # BLD: 9.37 K/UL — SIGNIFICANT CHANGE UP (ref 3.8–10.5)
WBC # FLD AUTO: 9.37 K/UL — SIGNIFICANT CHANGE UP (ref 3.8–10.5)

## 2019-10-16 PROCEDURE — 99285 EMERGENCY DEPT VISIT HI MDM: CPT

## 2019-10-16 PROCEDURE — 90792 PSYCH DIAG EVAL W/MED SRVCS: CPT

## 2019-10-16 NOTE — ED BEHAVIORAL HEALTH ASSESSMENT NOTE - DESCRIPTION
identifies as female but has not pursued transition yet (no hormones or surgery) unemployed. Lives alone in [facility] apartment program. calm, cooperative, pleasant  Vital Signs Last 24 Hrs  T(C): 36.9 (16 Oct 2019 19:44), Max: 37 (16 Oct 2019 16:52)  T(F): 98.4 (16 Oct 2019 19:44), Max: 98.6 (16 Oct 2019 16:52)  HR: 78 (16 Oct 2019 19:44) (78 - 90)  BP: 138/90 (16 Oct 2019 19:44) (133/96 - 138/90)  BP(mean): --  RR: 17 (16 Oct 2019 19:44) (17 - 18)  SpO2: 100% (16 Oct 2019 19:44) (100% - 100%)

## 2019-10-16 NOTE — ED PROVIDER NOTE - ATTENDING CONTRIBUTION TO CARE
Patient endorsing anxiety, depression and SI due to stress from financial and personal stressors. Denies an active plan. Denies any ha, cp, sob, abd pain, vomiting, diarrhea, dysuria, falls, trauma.  exam  GEN - NAD; well appearing; A+O x3   HEAD - NC/AT   EYES- PERRL, EOMI  ENT: Airway patent, mmm, Oral cavity and pharynx normal.    NECK: Neck supple  PULMONARY - CTA b/l, symmetric breath sounds.   CARDIAC -s1s2, RRR, no M,G,R  ABDOMEN - +BS, ND, NT, soft, no guarding  BACK - no CVA tenderness, Normal  spine   EXTREMITIES - FROM  SKIN - no rash or bruising   NEUROLOGIC - alert, speech clear, no focal deficits  PSYCH -appears sad, calm, cooperative, denies hi, hallucinations. Denies active si.  a/p-patient with anxiety/depression/si. No medical complaints, benign physical exam. Plan for psych eval, reass.

## 2019-10-16 NOTE — ED ADULT NURSE NOTE - OBJECTIVE STATEMENT
Pt brought in by EMS from residence after speaking to  about feeling depressed in current living situation. Pt states she would feel happier if she lived back at [facility]. Pt denies SI/HI/AH/VH. Pt calm and cooperative in ED. Will monitor.
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
## 2019-10-16 NOTE — ED BEHAVIORAL HEALTH ASSESSMENT NOTE - REFERRAL / APPOINTMENT DETAILS
patient to follow up with mental health providers at [facility] and [facility]. Patient provided with local transgender health resources.

## 2019-10-16 NOTE — ED BEHAVIORAL HEALTH ASSESSMENT NOTE - SAFETY PLAN ADDT'L DETAILS
Education provided regarding environmental safety / lethal means restriction/Provision of National Suicide Prevention Lifeline 2-292-414-MDPZ (0195)/Safety plan discussed with...
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
## 2019-10-16 NOTE — ED BEHAVIORAL HEALTH ASSESSMENT NOTE - OTHER PAST PSYCHIATRIC HISTORY (INCLUDE DETAILS REGARDING ONSET, COURSE OF ILLNESS, INPATIENT/OUTPATIENT TREATMENT)
Patient has at least 4 prior inpatient psychiatric hospitalizations, last known in 2017, including a hospitalization at [place]. Currently follows at [facility] with psychiatrist Dr. Pantoja and therapist Dr. Toure, and also attends a weekly program through [employer]. No history of suicide attempts.

## 2019-10-16 NOTE — ED BEHAVIORAL HEALTH ASSESSMENT NOTE - ADDITIONAL DETAILS ALL
patient reports a history of suicidal thoughts many years ago but never attempted and no history of SIB

## 2019-10-16 NOTE — ED PROVIDER NOTE - CLINICAL SUMMARY MEDICAL DECISION MAKING FREE TEXT BOX
This is a 58 yr old transgender (male to female) pmh schizophrenia with c/o sever depression, anxiety.   Blood work, ua tox, serum r/o underlying medical causes.  Collateral info obtain by ADRIANO from [facility] This is a 58 yr old transgender (male to female) pmh schizophrenia with c/o sever depression, anxiety.   Blood work, ua tox, serum r/o underlying medical causes.- WNL  Collateral info obtain by ADRIANO from Health system  Psych consult - cleared pt   There is no clinical evidence of intoxication, or any acute medical problem requiring immediate intervention.

## 2019-10-16 NOTE — ED BEHAVIORAL HEALTH NOTE - BEHAVIORAL HEALTH NOTE
Worker spoke to patient’s  Teo Schneider ([phone]) from [facility]. All information is as per Mr. Schneider:    Mr. Schneider states that he is not aware of the case but he knows that the case was signed out. He states that the patient’s  left for the day. He states that according to the  the patient called the residence that she no longer resides at and reported suicidal thoughts. Mr. Schneider reports that he does not know what the patient reported and this was not relayed to him. Mr. Schneider states that the patient’s  activated the call. He states that patient lives alone in apartment as is able to function independently. Worker called the after hours line provided by Teo Schneider ([phone] and spoke to counselor Dane who would not provide last name. Worker informed Dane that patient will be returning back to his residence. Worker also called Teo and left voicemail regarding patient discharge ([phone] ext. [phone]) Per provider, patient is cleared and is able to return to their previous residence.  has spoken to Teo . confirmed that patient’s mode of transportation is [employer] and that patient travels INDEPENDENTLY. Clinical provider is  in agreement with [facility] back to group home. Worker spoke to patient’s  Teo Schneider ([phone]) from [facility]. All information is as per Mr. Schneider:    Mr. Schneider states that he is not aware of the case but he knows that the case was signed out. He states that the patient’s  left for the day. He states that according to the  the patient called the residence that she no longer resides at and reported suicidal thoughts. Mr. Schneider reports that he does not know what the patient reported and this was not relayed to him. Mr. Schneider states that the patient’s  activated the call. He states that patient lives alone in apartment as is able to function independently. Worker called the after hours line provided by Teo Schneider ([phone] and spoke to counselor Dane who would not provide last name. Worker informed Dane that patient will be returning back to his residence. Worker also called Teo and left voicemail regarding patient discharge ([phone] ext. [phone]) Per provider, patient is cleared and is able to return to their previous residence.  has spoken to Teo . confirmed that patient’s mode of transportation is [employer] and that patient travels INDEPENDENTLY. Clinical provider is  in agreement with [facility] back to group home. Worker called [phone] and arranged for ride to transport patient back home. Invoice #[id number]

## 2019-10-16 NOTE — ED ADULT TRIAGE NOTE - CHIEF COMPLAINT QUOTE
pt reports was sent to ER for depression evaluation pt states " I was trying to get back to [place] and they thought I am having thoughts of suicide" pt denies SI, HI or AVH, reports hx of depression on Abilify, states complaints, denies drug or ETOH use PTA. calm and cooperative.

## 2019-10-16 NOTE — ED BEHAVIORAL HEALTH ASSESSMENT NOTE - VIOLENCE PROTECTIVE FACTORS:
Engagement in treatment/Good treatment response/compliance/Insight into violence risk and need for management/treatment/Residential stability
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
## 2019-10-16 NOTE — ED PROVIDER NOTE - PROGRESS NOTE DETAILS
patient evaluated by psychiatry-cleared for dc, results d/w patient and copies given. Understands f/u instructions. OK for dc.

## 2019-10-16 NOTE — ED BEHAVIORAL HEALTH ASSESSMENT NOTE - AXIS IV
Problems with interaction with legal system/Problems with primary support/Occupational problems/Economic problems

## 2019-10-16 NOTE — ED PROVIDER NOTE - NS ED ROS FT
GENERAL: No fever, no chills  EYES: no change in vision  HEENT: no trouble swallowing, no trouble speaking  CARDIAC: no chest pain  PULMONARY: no cough, no shortness of breath  GI: no abdominal pain, no nausea, no vomiting, no diarrhea, no constipation  : No dysuria, no frequency, no change in appearance, or odor of urine  SKIN: no rashes  NEURO: no headache, no weakness  MSK: No joint pain
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
## 2019-10-16 NOTE — ED PROVIDER NOTE - NSFOLLOWUPCLINICS_GEN_ALL_ED_FT
[facility]  Behavioral Health  [address] [place] [address]  Phone: [phone]  Fax:   Follow Up Time:

## 2019-10-16 NOTE — ED PROVIDER NOTE - PATIENT PORTAL LINK FT
You can access the FollowMyHealth Patient Portal offered by [facility] by registering at the following website: [email or web address]. By joining [email or web address]’s FollowMyHealth portal, you will also be able to view your health information using other applications (apps) compatible with our system.

## 2019-10-16 NOTE — ED BEHAVIORAL HEALTH ASSESSMENT NOTE - SAFETY PLAN DETAILS
go to the nearest ER or call 911 if symptoms worsen or you have thoughts to hurt self or someone else

## 2019-10-16 NOTE — ED BEHAVIORAL HEALTH ASSESSMENT NOTE - SUICIDE PROTECTIVE FACTORS
Responsibility to family and others/Identifies reasons for living/Positive therapeutic relationships/Has future plans

## 2019-10-17 NOTE — ED BEHAVIORAL HEALTH NOTE - BEHAVIORAL HEALTH NOTE
Worker called [facility] supportive housing and spoke to  Victor Manuel ([phone]) who states that they will follow up with patient today upon discharge for high risk follow up.
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
## 2020-02-01 PROCEDURE — G9005: CPT

## 2020-04-01 ENCOUNTER — OUTPATIENT (OUTPATIENT)
Dept: OUTPATIENT SERVICES | Facility: HOSPITAL | Age: 60
LOS: 1 days | End: 2020-04-01
Payer: MEDICAID

## 2020-04-30 DIAGNOSIS — Z71.89 OTHER SPECIFIED COUNSELING: ICD-10-CM

## 2020-10-01 PROCEDURE — G9005: CPT

## 2022-01-01 NOTE — ED BEHAVIORAL HEALTH ASSESSMENT NOTE - ADULT OR CHILD PROTECTIVE SERVICES INVOLVEMENT
DISPLAY PLAN FREE TEXT DISPLAY PLAN FREE TEXT DISPLAY PLAN FREE TEXT DISPLAY PLAN FREE TEXT DISPLAY PLAN FREE TEXT DISPLAY PLAN FREE TEXT DISPLAY PLAN FREE TEXT DISPLAY PLAN FREE TEXT DISPLAY PLAN FREE TEXT DISPLAY PLAN FREE TEXT No

## 2022-06-29 NOTE — ED PROVIDER NOTE - HEME LYMPH, MLM
Instructions: This plan will send the code FBSE to the PM system.  DO NOT or CHANGE the price.
Detail Level: Simple
Price (Do Not Change): 0.00
No adenopathy or splenomegaly. No cervical or inguinal lymphadenopathy.

## 2023-01-16 NOTE — ED PROVIDER NOTE - SKIN, MLM
S: 54 y.o. female here for depression. Last month started on zoloft. Insomnia slightly improved. Feels room for improvement in mood. Copd controlled. HTN controlled. R ear tinnitus, all day long. Some hearing loss. Months? No vertigo. O: VS: /79   Pulse 79   Temp 98.6 °F (37 °C) (Temporal)   Wt 213 lb (96.6 kg)   LMP 09/13/2012   SpO2 94%   BMI 35.45 kg/m²    General: NAD, alert and interacting appropriately. CV:  RRR, no gallops, rubs, or murmurs    Resp: CTAB   Ext:  No edema    Impression: depression. HTN. Copd. Tinnitus, hearing loss. Plan:   Increase zoloft  CPM HTN  CPM copd. Send to audiology  Shingles shot recommended    Attending Physician Statement  I have discussed the case, including pertinent history and exam findings with the resident. I agree with the documented assessment and plan. Skin normal color for race, warm, dry and intact. No evidence of rash.

## 2023-05-25 NOTE — ED BEHAVIORAL HEALTH ASSESSMENT NOTE - NS ED BHA MED ROS EYES
Detail Level: Detailed Depth Of Biopsy: dermis Was A Bandage Applied: Yes Size Of Lesion In Cm: 1 Biopsy Type: H and E Anesthesia Type: 1% lidocaine with epinephrine Anesthesia Volume In Cc (Will Not Render If 0): 0.5 Additional Anesthesia Volume In Cc (Will Not Render If 0): 0 Hemostasis: Drysol and Electrocautery Wound Care: Bacitracin Dressing: bandage Destruction After The Procedure: No Type Of Destruction Used: Curettage Curettage Text: The wound bed was treated with curettage after the biopsy was performed. Cryotherapy Text: The wound bed was treated with cryotherapy after the biopsy was performed. Electrodesiccation Text: The wound bed was treated with electrodesiccation after the biopsy was performed. Electrodesiccation And Curettage Text: The wound bed was treated with electrodesiccation and curettage after the biopsy was performed. Silver Nitrate Text: The wound bed was treated with silver nitrate after the biopsy was performed. Lab: [id number] Lab Facility: [id number] Consent: Written consent was obtained and risks were reviewed including but not limited to scarring, infection, bleeding, scabbing, incomplete removal, nerve damage and allergy to anesthesia. Post-Care Instructions: I reviewed with the patient in detail post-care instructions. Patient is to keep the biopsy site dry overnight, and then apply bacitracin twice daily until healed. Patient may apply hydrogen peroxide soaks to remove any crusting. Notification Instructions: Patient will be notified of biopsy results. However, patient instructed to call the office if not contacted within 2 weeks. Billing Type: Third-Party Bill Information: Selecting Yes will display possible errors in your note based on the variables you have selected. This validation is only offered as a suggestion for you. PLEASE NOTE THAT THE VALIDATION TEXT WILL BE REMOVED WHEN YOU FINALIZE YOUR NOTE. IF YOU WANT TO FAX A PRELIMINARY NOTE YOU WILL NEED TO TOGGLE THIS TO 'NO' IF YOU DO NOT WANT IT IN YOUR FAXED NOTE. No complaints

## 2023-08-24 NOTE — ED ADULT TRIAGE NOTE - NS ED NURSE AMBULANCES
Pre-Procedural Saturation was taken from the Right Hand. Sat result is 93% [facility]
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
## 2024-02-03 NOTE — ED BEHAVIORAL HEALTH ASSESSMENT NOTE - NS ED BHA PLAN TR BH CONTACTED FT
Well controlled through diet and exercise. Hemoglobin A1c and urine albumin UTD.  Encouraged to schedule for diabetic foot and eye exams if not up-to-date.   Residence called and disposition discussed

## 2024-07-31 NOTE — ED ADULT NURSE NOTE - ISOLATION TYPE:
None Rituxan Counseling:  I discussed with the patient the risks of Rituxan infusions. Side effects can include infusion reactions, severe drug rashes including mucocutaneous reactions, reactivation of latent hepatitis and other infections and rarely progressive multifocal leukoencephalopathy.  All of the patient's questions and concerns were addressed. details…

## 2024-11-20 NOTE — ED BEHAVIORAL HEALTH ASSESSMENT NOTE - NS ED BHA MED ROS MUSCULOSKELETAL
Last visit: 11-4-2024  Next visit: none  due around 5-4-2025    Last labs:   GOT/AST (Units/L)   Date Value   11/04/2024 19     GPT/ALT (Units/L)   Date Value   11/04/2024 20     Creatinine (mg/dL)   Date Value   11/04/2024 0.72     PLT (K/mcL)   Date Value   11/04/2024 290     WBC (K/mcL)   Date Value   11/04/2024 7.1     RBC (mil/mcL)   Date Value   11/04/2024 5.40 (H)     HGB (g/dL)   Date Value   11/04/2024 15.8 (H)     HCT (%)   Date Value   11/04/2024 49.5 (H)       No results found for: \"HSAB\", \"HBAGA\", \"HCAB\", \"HBINT\", \"HCVA\"   Quantiferon Plus Interpretation (no units)   Date Value   11/04/2024 Negative       CE checked: yes  Recent labs in CareEverywhere:     Forwarded to prescribing physician for signature or denial.      
No complaints

## 2025-03-24 NOTE — ED PROVIDER NOTE - OBJECTIVE STATEMENT
Buck Sloan is calling to request a refill on the following medication(s):    Medication Request:  Requested Prescriptions     Pending Prescriptions Disp Refills    FARXIGA 10 MG tablet [Pharmacy Med Name: Farxiga Oral Tablet 10 MG] 90 tablet 0     Sig: TAKE 1 TABLET BY MOUTH IN THE MORNING       Last Visit Date (If Applicable):  Visit date not found    Next Visit Date:    Visit date not found                 This is a 58 yr old transgender (male to female) pmh schizophrenia with c/o sever depression, anxiety. Bib ems,  Hazel () was at hospital who reported pt called agency ([facility] [phone]) and expressed her feelings of SI, because she feels, very overwhelmed about financial issues and job. Upon arrival, calm cooperative, sad tearful, but denies SI. Pt states feels very down, stressed and isolated for the last couple of month. Denies HI/AH/VH. Denies falling, punching or kicking any objects. Denies pain, SOB, fever, chills, chest and abdominal discomfort. Denies recent use of alcohol or illicit drug. No evidence of physical injuries.